# Patient Record
(demographics unavailable — no encounter records)

---

## 2025-01-30 NOTE — ASSESSMENT
[FreeTextEntry1] : Left high ankle sprain  Plan anti-inflammatories with GI precautions, ice 20 minutes on and 20 minutes off and the RICE protocol of rest ice compression and elevation.  The patient does states she ordered a cam walker boot already and she has it at home waiting for her and she will use that to ambulate along with the crutches.  I am also recommend an MRI of the left ankle to rule out a high ankle sprain.  She will use the crutches to be nonweightbearing and follow-up after the MRI.  All questions were answered she is agreeable with the plan.  Patient was advised if they develop any severe pain, numbness or tingling or pain with range of motion to the foot/toes they must call or go to the emergency room immediately. All the signs and symptoms of compartment syndrome were explained.  The patient understands.  This medical record was created utilizing Dragon voice recognition software.  This software is not perfect and may occasionally create typographical errors which may be reflected in the above medical record.

## 2025-01-30 NOTE — IMAGING
[Left] : left ankle [de-identified] : He is alert and oriented vital signs are stable.  She is walking with crutches not weightbearing on the left side.  Examination of the left foot and ankle reveals swelling laterally over the lateral malleolus.  She has no tenderness on the proximal fibula.  She has tenderness on the distal fibular and the ATFL as well as the CFL ligaments.  No tenderness on the deltoid ligament to me malleolus.  No tenderness on Achilles tendon which is intact no calf tenderness.  Does have some tenderness on the calcaneus.  No tenderness around the midfoot or Lisfranc region or the fifth metatarsal base region.  She has limited range of motion of the ankle due to pain.  She is able to wiggle her toes.  She has tenderness around the syndesmosis. She has a normal neurologic exam. Normal vascular exam. Normal skin exam. no evidence for open wounds infection or compartment syndrome.  [FreeTextEntry9] : X-rays left ankle 3 views revealed no apparent fractures any dislocations

## 2025-01-30 NOTE — HISTORY OF PRESENT ILLNESS
[de-identified] : Patient's 40-year-old female presents with a 1 day history of left ankle pain.  Patient states she was walking down the steps yesterday on January 29, 2025 and missed the bottom 2 steps.  Says she twisted her left ankle.  She went to [facility] where she says there was no radiologist and was told there was no fracture.  She is having difficulty ambulating is using crutches.  Nuys any foot pain.